# Patient Record
Sex: FEMALE | Race: OTHER | NOT HISPANIC OR LATINO
[De-identification: names, ages, dates, MRNs, and addresses within clinical notes are randomized per-mention and may not be internally consistent; named-entity substitution may affect disease eponyms.]

---

## 2023-06-20 ENCOUNTER — APPOINTMENT (OUTPATIENT)
Dept: ORTHOPEDIC SURGERY | Facility: CLINIC | Age: 58
End: 2023-06-20
Payer: COMMERCIAL

## 2023-06-20 VITALS — HEIGHT: 60 IN | BODY MASS INDEX: 40.84 KG/M2 | WEIGHT: 208 LBS

## 2023-06-20 DIAGNOSIS — M17.0 BILATERAL PRIMARY OSTEOARTHRITIS OF KNEE: ICD-10-CM

## 2023-06-20 PROBLEM — Z00.00 ENCOUNTER FOR PREVENTIVE HEALTH EXAMINATION: Status: ACTIVE | Noted: 2023-06-20

## 2023-06-20 PROCEDURE — 99203 OFFICE O/P NEW LOW 30 MIN: CPT

## 2023-06-20 PROCEDURE — 73562 X-RAY EXAM OF KNEE 3: CPT | Mod: LT

## 2023-06-20 RX ORDER — MELOXICAM 15 MG/1
15 TABLET ORAL
Qty: 30 | Refills: 1 | Status: ACTIVE | COMMUNITY
Start: 2023-06-20 | End: 1900-01-01

## 2023-06-20 NOTE — HISTORY OF PRESENT ILLNESS
[de-identified] : Patient is a 57-year-old female reports to the office for evaluation of her bilateral knee pain has been aggravating her on and off for the past several weeks.  She has tried OTC NSAIDs and exercises which have given her no relief.  Walking, up and down stairs, getting up from seated position, flexing extending both knees aggravate the patient's pain.  Admits the knee stacey/gives out on her.  Denies any numbness or tingling.  Has a history of right knee arthroscopic knee surgery for meniscal tears done several years ago in [place].

## 2023-06-20 NOTE — DISCUSSION/SUMMARY
[de-identified] : Mobic 15 mg PO QD PRN was sent to patient's pharmacy to help alleviate their symptoms.  Patient was advised to monitor blood pressure while taking this medication.  The patient was advised to rest/ice the area and may alternate with warm compresses as needed. \par \par A script for physical therapy was printed for the patient so they can get started on that.  The knee conditioning program from the [facility] was given to the patient so they may try that at home.\par \par Cortisone injections discussed but not given secondary to the patient's diabetes.  Bilateral knee MRI ordered for further evaluation.  Patient was advised to call the office a few days after getting the MRI done to discuss results over the phone.\par \par The patient will follow-up in 8-12 weeks for further evaluation.  All of the patient's questions/concerns were answered in detail.\par \par

## 2023-06-20 NOTE — IMAGING
[de-identified] : Knee exam is as follows: Minimal effusion noted.  No erythema or ecchymosis.  Able to form active straight leg raise.  Knee flexion from 0 to 100 degrees with stiffness and pain.  Patellofemoral, medial/lateral joint line tenderness to palpation.  Calf soft nontender.  Positive Jesus Alberto's.  Light touch intact throughout.  Mild antalgic gait\par \par Bilateral knee x-rays taken in office today reveal no obvious fractures, subluxations, or locations.  Joint space narrowing along with moderate patellofemoral/osteoarthritic changes noted.

## 2023-08-01 ENCOUNTER — APPOINTMENT (OUTPATIENT)
Dept: ORTHOPEDIC SURGERY | Facility: CLINIC | Age: 58
End: 2023-08-01